# Patient Record
Sex: MALE | Race: WHITE | NOT HISPANIC OR LATINO | Employment: FULL TIME | URBAN - METROPOLITAN AREA
[De-identification: names, ages, dates, MRNs, and addresses within clinical notes are randomized per-mention and may not be internally consistent; named-entity substitution may affect disease eponyms.]

---

## 2017-02-06 ENCOUNTER — OFFICE VISIT - HEALTHEAST (OUTPATIENT)
Dept: INTERNAL MEDICINE | Facility: CLINIC | Age: 57
End: 2017-02-06

## 2017-02-06 DIAGNOSIS — R10.2 SUPRAPUBIC PAIN: ICD-10-CM

## 2017-02-06 DIAGNOSIS — Z82.49 FAMILY HISTORY OF HEART DISEASE: ICD-10-CM

## 2017-02-06 DIAGNOSIS — E78.5 HLD (HYPERLIPIDEMIA): ICD-10-CM

## 2017-02-06 DIAGNOSIS — Z72.0 TOBACCO USE: ICD-10-CM

## 2017-02-06 DIAGNOSIS — Z11.3 ROUTINE SCREENING FOR STI (SEXUALLY TRANSMITTED INFECTION): ICD-10-CM

## 2017-02-06 DIAGNOSIS — Z00.00 ANNUAL PHYSICAL EXAM: ICD-10-CM

## 2017-02-06 ASSESSMENT — MIFFLIN-ST. JEOR: SCORE: 1720.26

## 2017-02-07 ENCOUNTER — AMBULATORY - HEALTHEAST (OUTPATIENT)
Dept: LAB | Facility: CLINIC | Age: 57
End: 2017-02-07

## 2017-02-07 DIAGNOSIS — Z11.3 ROUTINE SCREENING FOR STI (SEXUALLY TRANSMITTED INFECTION): ICD-10-CM

## 2017-02-07 DIAGNOSIS — E78.5 HLD (HYPERLIPIDEMIA): ICD-10-CM

## 2017-02-07 DIAGNOSIS — R10.2 SUPRAPUBIC PAIN: ICD-10-CM

## 2017-02-07 LAB
CHOLEST SERPL-MCNC: 213 MG/DL
FASTING STATUS PATIENT QL REPORTED: YES
HDLC SERPL-MCNC: 34 MG/DL
HIV 1+2 AB+HIV1 P24 AG SERPL QL IA: NEGATIVE
LDLC SERPL CALC-MCNC: 135 MG/DL
TRIGL SERPL-MCNC: 220 MG/DL

## 2017-02-08 LAB
HBV CORE AB SERPL QL IA: NEGATIVE
HBV SURFACE AG SERPL QL IA: NEGATIVE
HCV AB SERPL QL IA: NEGATIVE
HEPATITIS B SURFACE ANTIBODY LHE- HISTORICAL: NEGATIVE

## 2017-02-09 ENCOUNTER — COMMUNICATION - HEALTHEAST (OUTPATIENT)
Dept: INTERNAL MEDICINE | Facility: CLINIC | Age: 57
End: 2017-02-09

## 2017-02-09 ENCOUNTER — COMMUNICATION - HEALTHEAST (OUTPATIENT)
Dept: LAB | Facility: CLINIC | Age: 57
End: 2017-02-09

## 2017-02-13 ENCOUNTER — AMBULATORY - HEALTHEAST (OUTPATIENT)
Dept: LAB | Facility: CLINIC | Age: 57
End: 2017-02-13

## 2017-02-13 DIAGNOSIS — Z11.3 ROUTINE SCREENING FOR STI (SEXUALLY TRANSMITTED INFECTION): ICD-10-CM

## 2017-02-14 ENCOUNTER — COMMUNICATION - HEALTHEAST (OUTPATIENT)
Dept: INTERNAL MEDICINE | Facility: CLINIC | Age: 57
End: 2017-02-14

## 2017-07-19 ENCOUNTER — RECORDS - HEALTHEAST (OUTPATIENT)
Dept: ADMINISTRATIVE | Facility: OTHER | Age: 57
End: 2017-07-19

## 2017-09-07 ENCOUNTER — OFFICE VISIT - HEALTHEAST (OUTPATIENT)
Dept: INTERNAL MEDICINE | Facility: CLINIC | Age: 57
End: 2017-09-07

## 2017-09-07 DIAGNOSIS — M54.50 LOW BACK PAIN: ICD-10-CM

## 2017-09-07 DIAGNOSIS — E78.5 HLD (HYPERLIPIDEMIA): ICD-10-CM

## 2017-09-14 ENCOUNTER — OFFICE VISIT - HEALTHEAST (OUTPATIENT)
Dept: PHYSICAL THERAPY | Facility: REHABILITATION | Age: 57
End: 2017-09-14

## 2017-09-14 DIAGNOSIS — M62.81 MUSCLE WEAKNESS (GENERALIZED): ICD-10-CM

## 2017-09-14 DIAGNOSIS — M53.3 SI (SACROILIAC) JOINT DYSFUNCTION: ICD-10-CM

## 2017-09-21 ENCOUNTER — OFFICE VISIT - HEALTHEAST (OUTPATIENT)
Dept: PHYSICAL THERAPY | Facility: REHABILITATION | Age: 57
End: 2017-09-21

## 2017-09-21 DIAGNOSIS — M62.81 MUSCLE WEAKNESS (GENERALIZED): ICD-10-CM

## 2017-09-21 DIAGNOSIS — M53.3 SI (SACROILIAC) JOINT DYSFUNCTION: ICD-10-CM

## 2017-09-27 ENCOUNTER — OFFICE VISIT - HEALTHEAST (OUTPATIENT)
Dept: PHYSICAL THERAPY | Facility: REHABILITATION | Age: 57
End: 2017-09-27

## 2017-09-27 DIAGNOSIS — M62.81 MUSCLE WEAKNESS (GENERALIZED): ICD-10-CM

## 2017-09-27 DIAGNOSIS — M53.3 SI (SACROILIAC) JOINT DYSFUNCTION: ICD-10-CM

## 2018-04-05 ENCOUNTER — AMBULATORY - HEALTHEAST (OUTPATIENT)
Dept: INTERNAL MEDICINE | Facility: CLINIC | Age: 58
End: 2018-04-05

## 2018-12-03 ENCOUNTER — RECORDS - HEALTHEAST (OUTPATIENT)
Dept: LAB | Facility: CLINIC | Age: 58
End: 2018-12-03

## 2018-12-04 LAB
ALBUMIN SERPL-MCNC: 4 G/DL (ref 3.5–5)
ALP SERPL-CCNC: 120 U/L (ref 45–120)
ALT SERPL W P-5'-P-CCNC: 20 U/L (ref 0–45)
ANION GAP SERPL CALCULATED.3IONS-SCNC: 8 MMOL/L (ref 5–18)
AST SERPL W P-5'-P-CCNC: 16 U/L (ref 0–40)
BILIRUB SERPL-MCNC: 0.5 MG/DL (ref 0–1)
BUN SERPL-MCNC: 12 MG/DL (ref 8–22)
CALCIUM SERPL-MCNC: 8.8 MG/DL (ref 8.5–10.5)
CHLORIDE BLD-SCNC: 108 MMOL/L (ref 98–107)
CHOLEST SERPL-MCNC: 208 MG/DL
CO2 SERPL-SCNC: 25 MMOL/L (ref 22–31)
CREAT SERPL-MCNC: 0.75 MG/DL (ref 0.7–1.3)
FASTING STATUS PATIENT QL REPORTED: NO
GFR SERPL CREATININE-BSD FRML MDRD: >60 ML/MIN/1.73M2
GLUCOSE BLD-MCNC: 75 MG/DL (ref 70–125)
HDLC SERPL-MCNC: 38 MG/DL
LDLC SERPL CALC-MCNC: 142 MG/DL
POTASSIUM BLD-SCNC: 3.9 MMOL/L (ref 3.5–5)
PROT SERPL-MCNC: 6.9 G/DL (ref 6–8)
SODIUM SERPL-SCNC: 141 MMOL/L (ref 136–145)
TRIGL SERPL-MCNC: 138 MG/DL

## 2020-06-24 ENCOUNTER — RECORDS - HEALTHEAST (OUTPATIENT)
Dept: ADMINISTRATIVE | Facility: OTHER | Age: 60
End: 2020-06-24

## 2020-07-10 ENCOUNTER — HOSPITAL ENCOUNTER (OUTPATIENT)
Dept: ULTRASOUND IMAGING | Facility: HOSPITAL | Age: 60
Discharge: HOME | End: 2020-07-10
Attending: FAMILY MEDICINE

## 2020-07-10 DIAGNOSIS — I70.219 ATHEROSCLEROSIS OF NATIVE ARTERIES OF EXTREMITIES WITH INTERMITTENT CLAUDICATION, UNSPECIFIED EXTREMITY (H): ICD-10-CM

## 2020-07-15 ENCOUNTER — RECORDS - HEALTHEAST (OUTPATIENT)
Dept: ADMINISTRATIVE | Facility: OTHER | Age: 60
End: 2020-07-15

## 2020-07-23 ENCOUNTER — AMBULATORY - HEALTHEAST (OUTPATIENT)
Dept: INTERVENTIONAL RADIOLOGY/VASCULAR | Facility: HOSPITAL | Age: 60
End: 2020-07-23

## 2020-07-23 ENCOUNTER — RECORDS - HEALTHEAST (OUTPATIENT)
Dept: ADMINISTRATIVE | Facility: OTHER | Age: 60
End: 2020-07-23

## 2020-07-23 DIAGNOSIS — I70.213 ATHEROSCLEROSIS OF NATIVE ARTERY OF BOTH LOWER EXTREMITIES WITH INTERMITTENT CLAUDICATION (H): ICD-10-CM

## 2020-07-23 RX ORDER — CLOPIDOGREL BISULFATE 75 MG/1
75 TABLET ORAL DAILY
Qty: 90 TABLET | Refills: 3 | Status: SHIPPED | OUTPATIENT
Start: 2020-07-23

## 2020-07-23 RX ORDER — ATORVASTATIN CALCIUM 40 MG/1
40 TABLET, FILM COATED ORAL DAILY
Qty: 90 TABLET | Refills: 3 | Status: SHIPPED | OUTPATIENT
Start: 2020-07-23 | End: 2021-07-23

## 2020-08-19 ENCOUNTER — AMBULATORY - HEALTHEAST (OUTPATIENT)
Dept: INTERVENTIONAL RADIOLOGY/VASCULAR | Facility: HOSPITAL | Age: 60
End: 2020-08-19

## 2020-08-19 DIAGNOSIS — Z11.59 ENCOUNTER FOR SCREENING FOR OTHER VIRAL DISEASES: ICD-10-CM

## 2020-08-20 ENCOUNTER — RECORDS - HEALTHEAST (OUTPATIENT)
Dept: LAB | Facility: CLINIC | Age: 60
End: 2020-08-20

## 2020-08-20 LAB
ALBUMIN SERPL-MCNC: 4 G/DL (ref 3.5–5)
ALP SERPL-CCNC: 143 U/L (ref 45–120)
ALT SERPL W P-5'-P-CCNC: 31 U/L (ref 0–45)
ANION GAP SERPL CALCULATED.3IONS-SCNC: 9 MMOL/L (ref 5–18)
AST SERPL W P-5'-P-CCNC: 17 U/L (ref 0–40)
BILIRUB SERPL-MCNC: 0.4 MG/DL (ref 0–1)
BUN SERPL-MCNC: 11 MG/DL (ref 8–22)
CALCIUM SERPL-MCNC: 8.7 MG/DL (ref 8.5–10.5)
CHLORIDE BLD-SCNC: 105 MMOL/L (ref 98–107)
CHOLEST SERPL-MCNC: 109 MG/DL
CO2 SERPL-SCNC: 25 MMOL/L (ref 22–31)
CREAT SERPL-MCNC: 0.94 MG/DL (ref 0.7–1.3)
FASTING STATUS PATIENT QL REPORTED: ABNORMAL
GFR SERPL CREATININE-BSD FRML MDRD: >60 ML/MIN/1.73M2
GLUCOSE BLD-MCNC: 111 MG/DL (ref 70–125)
HDLC SERPL-MCNC: 30 MG/DL
LDLC SERPL CALC-MCNC: 57 MG/DL
POTASSIUM BLD-SCNC: 4.8 MMOL/L (ref 3.5–5)
PROT SERPL-MCNC: 6.7 G/DL (ref 6–8)
SODIUM SERPL-SCNC: 139 MMOL/L (ref 136–145)
TRIGL SERPL-MCNC: 108 MG/DL

## 2020-09-02 ENCOUNTER — RECORDS - HEALTHEAST (OUTPATIENT)
Dept: ADMINISTRATIVE | Facility: OTHER | Age: 60
End: 2020-09-02

## 2020-09-03 ENCOUNTER — RECORDS - HEALTHEAST (OUTPATIENT)
Dept: ADMINISTRATIVE | Facility: OTHER | Age: 60
End: 2020-09-03

## 2020-09-08 ENCOUNTER — RECORDS - HEALTHEAST (OUTPATIENT)
Dept: ADMINISTRATIVE | Facility: OTHER | Age: 60
End: 2020-09-08

## 2020-09-08 ENCOUNTER — AMBULATORY - HEALTHEAST (OUTPATIENT)
Dept: FAMILY MEDICINE | Facility: CLINIC | Age: 60
End: 2020-09-08

## 2020-09-08 DIAGNOSIS — Z11.59 ENCOUNTER FOR SCREENING FOR OTHER VIRAL DISEASES: ICD-10-CM

## 2020-09-09 ENCOUNTER — RECORDS - HEALTHEAST (OUTPATIENT)
Dept: ADMINISTRATIVE | Facility: OTHER | Age: 60
End: 2020-09-09

## 2020-09-09 LAB
SARS-COV-2 PCR COMMENT: NORMAL
SARS-COV-2 RNA SPEC QL NAA+PROBE: NEGATIVE
SARS-COV-2 VIRUS SPECIMEN SOURCE: NORMAL

## 2020-09-10 ENCOUNTER — COMMUNICATION - HEALTHEAST (OUTPATIENT)
Dept: INTERVENTIONAL RADIOLOGY/VASCULAR | Facility: HOSPITAL | Age: 60
End: 2020-09-10

## 2020-09-10 ENCOUNTER — RECORDS - HEALTHEAST (OUTPATIENT)
Dept: ADMINISTRATIVE | Facility: OTHER | Age: 60
End: 2020-09-10

## 2020-09-10 ENCOUNTER — HOSPITAL ENCOUNTER (OUTPATIENT)
Dept: INTERVENTIONAL RADIOLOGY/VASCULAR | Facility: HOSPITAL | Age: 60
Discharge: HOME | End: 2020-09-10
Attending: RADIOLOGY | Admitting: RADIOLOGY

## 2020-09-10 ENCOUNTER — COMMUNICATION - HEALTHEAST (OUTPATIENT)
Dept: SCHEDULING | Facility: CLINIC | Age: 60
End: 2020-09-10

## 2020-09-10 DIAGNOSIS — I73.9 PVD (PERIPHERAL VASCULAR DISEASE) (H): ICD-10-CM

## 2020-09-10 DIAGNOSIS — I73.9 CLAUDICATION (H): ICD-10-CM

## 2020-09-10 LAB
ACT BLD: 133 SECONDS (ref 105–167)
ACT BLD: 331 SECONDS (ref 105–167)
ACT BLD: 352 SECONDS (ref 105–167)
CREAT SERPL-MCNC: 0.79 MG/DL (ref 0.7–1.3)
GFR SERPL CREATININE-BSD FRML MDRD: >60 ML/MIN/1.73M2
HGB BLD-MCNC: 17.4 G/DL (ref 14–18)
INR PPP: 0.93 (ref 0.9–1.1)
PLATELET # BLD AUTO: 160 THOU/UL (ref 140–440)

## 2020-09-10 ASSESSMENT — MIFFLIN-ST. JEOR: SCORE: 1704.83

## 2021-02-09 ENCOUNTER — AMBULATORY - HEALTHEAST (OUTPATIENT)
Dept: INTERVENTIONAL RADIOLOGY/VASCULAR | Facility: HOSPITAL | Age: 61
End: 2021-02-09

## 2021-05-05 ENCOUNTER — RECORDS - HEALTHEAST (OUTPATIENT)
Dept: LAB | Facility: CLINIC | Age: 61
End: 2021-05-05

## 2021-05-05 LAB — PSA SERPL-MCNC: 3.1 NG/ML (ref 0–4.5)

## 2021-05-18 ENCOUNTER — AMBULATORY - HEALTHEAST (OUTPATIENT)
Dept: INTERVENTIONAL RADIOLOGY/VASCULAR | Facility: HOSPITAL | Age: 61
End: 2021-05-18

## 2021-05-18 DIAGNOSIS — I73.9 PVD (PERIPHERAL VASCULAR DISEASE) (H): ICD-10-CM

## 2021-05-30 ENCOUNTER — RECORDS - HEALTHEAST (OUTPATIENT)
Dept: ADMINISTRATIVE | Facility: CLINIC | Age: 61
End: 2021-05-30

## 2021-05-30 VITALS — BODY MASS INDEX: 26.19 KG/M2 | WEIGHT: 193.4 LBS | HEIGHT: 72 IN

## 2021-05-31 VITALS — WEIGHT: 194 LBS | BODY MASS INDEX: 26.31 KG/M2

## 2021-06-01 ENCOUNTER — RECORDS - HEALTHEAST (OUTPATIENT)
Dept: LAB | Facility: CLINIC | Age: 61
End: 2021-06-01

## 2021-06-01 LAB
ANION GAP SERPL CALCULATED.3IONS-SCNC: 9 MMOL/L (ref 5–18)
BUN SERPL-MCNC: 14 MG/DL (ref 8–22)
CALCIUM SERPL-MCNC: 8.4 MG/DL (ref 8.5–10.5)
CHLORIDE BLD-SCNC: 105 MMOL/L (ref 98–107)
CO2 SERPL-SCNC: 25 MMOL/L (ref 22–31)
CREAT SERPL-MCNC: 0.79 MG/DL (ref 0.7–1.3)
GFR SERPL CREATININE-BSD FRML MDRD: >60 ML/MIN/1.73M2
GLUCOSE BLD-MCNC: 133 MG/DL (ref 70–125)
POTASSIUM BLD-SCNC: 4.5 MMOL/L (ref 3.5–5)
SODIUM SERPL-SCNC: 139 MMOL/L (ref 136–145)

## 2021-06-05 VITALS — WEIGHT: 190 LBS | HEIGHT: 72 IN | BODY MASS INDEX: 25.73 KG/M2

## 2021-06-08 NOTE — PROGRESS NOTES
"Assessment/Plan:     1. Annual physical exam  - I recommended he eat a healthy diet and exercise on a regular basis.  - The following high BMI interventions were performed this visit: encouragement to exercise    2. Routine screening for STI (sexually transmitted infection)  - HIV Antigen/Antibody Screening Cascade; Future  - Chlamydia trachomatis & Neisseria gonorrhoeae, Amplified Detection; Future  - Hepatitis B Core Antibody (Anti-HBc); Future  - Hepatitis B Surface Antibody (Anti-HBs); Future  - Hepatitis B Surface Antigen (HBsAG); Future  - Hepatitis C Antibody (Anti-HCV); Future    3. Suprapubic pain  - Urinalysis-UC if Indicated; Future  - Records requested from previous provider, reviewed available Care Everywhere records but unable to pull forward recent lab test  - He is advised to follow up sooner if symptoms are rapidly worsening/changing but given the length of time he has had symptoms will request records, await lab results and see him back in about 3-4 weeks     4. HLD (hyperlipidemia)  - Lipid Profile; Future  - Basic Metabolic Panel; Future  - [facility] LAV; Future    5. Tobacco use  - Smoking cessation discussion today. He is interested in smoking cessation and plans to stop smoking on 2/15/17. He is prescribed Chantix.       Subjective:     Blayne Jones is a 56 y.o. male who presents for an annual exam. He has several concerns today that he would like to have addressed.     He has a 2.5 year history of intermittent suprapubic pain. He has undergone a \"partial colonoscopy\" in the last 2.5 years and a colonoscopy for screening in the past 1 year. The pain worsens when he is having a bowel movement but does not resolve once he is done having a BM. He has a lot of gas. He denies blood in the stool or tarry stools. No blood in the urine. He denies having to push or strain to have a bowel movement. The pain has been more bothersome in the past 2.5 weeks and prior to this he had had a period of relatively " "mild or no pain. Typically the pain does not disrupt his usual activities.     LBP- chronic, does not do any particular treatments for this and no current concerns.     HLD- he was taking atorvastatin in the past but stopped this on his own after his work lipid panel was very low and didn't feel he needed to take the medication any longer. He has a family history of heart disease in his father who  at age 51 after a heart attack and his brother had a heart attack at age 55.     Tobacco use- interested in smoking cessation, he has tried Chantix in the past but \"didn't give it a fair try\". We reviewed lung cancer screening, he declines today but will consider this in the future.     Requests STI screening. He is in a new relationship and his partner requested that he have this done. He has a history of multiple partners in the past. No history of IV drug use. Only female partners.     The patient reports that there is not domestic violence in his life.     Healthy Habits:   Regular Exercise: No, walks in the summer.   Healthy Diet: No, recommended cutting back on soda and junk foods  Dental Visits Regularly: Yes  Colonoscopy: Yes, records requested         There is no immunization history on file for this patient.  Immunization status: UTD and documented         Current Outpatient Prescriptions   Medication Sig Dispense Refill     [START ON 3/6/2017] varenicline (CHANTIX CONTINUING MONTH BOX) 1 mg tablet Take 1 tablet (1 mg total) by mouth 2 (two) times a day. Take with full glass of water. 60 tablet 1     varenicline (CHANTIX STARTING MONTH NELSON) 0.5 mg (11)- 1 mg (42) tablet Give with meals and with a full glass of water. 53 tablet 0     No current facility-administered medications for this visit.      History reviewed. No pertinent past medical history.  Past Surgical History:   Procedure Laterality Date     CERVICAL SPINE SURGERY       Review of patient's allergies indicates no known allergies.  Family " History   Problem Relation Age of Onset     Diabetes Mother      Heart disease Father      Heart disease Brother      Social History     Social History     Marital status:      Spouse name: N/A     Number of children: N/A     Years of education: N/A     Occupational History     Not on file.     Social History Main Topics     Smoking status: Current Every Day Smoker     Packs/day: 1.00     Types: Cigarettes     Smokeless tobacco: Not on file     Alcohol use No      Comment: quit 4/7/1986     Drug use: Yes     Special: Heroin     Sexual activity: Not on file     Other Topics Concern     Not on file     Social History Narrative     No narrative on file       Review of Systems  General:  Negative except as noted above  Eyes: Negative except as noted above  Ears/Nose/Throat: Negative except as noted above  Cardiovascular: Negative except as noted above  Respiratory:  Negative except as noted above  Gastrointestinal:  Negative except as noted above  Musculoskeletal:  Negative except as noted above  Skin: Negative except as noted above  Neurologic: Negative except as noted above  Psychiatric: Negative except as noted above  Endocrine: Negative except as noted above  Heme/Lymphatic: Negative except as noted above   Allergic/Immunologic: Negative except as noted above      Objective:      Vitals:    02/06/17 1536   BP: 126/84   Patient Site: Left Arm   Patient Position: Sitting   Cuff Size: Adult Regular   Pulse: 64   Weight: 193 lb 6.4 oz (87.7 kg)   Height: 6' (1.829 m)     Wt Readings from Last 3 Encounters:   02/06/17 193 lb 6.4 oz (87.7 kg)     Body mass index is 26.23 kg/(m^2).     Physical Exam:  Constitutional: Well developed, well nourished, no acute distress.  HEENT: normocephalic/atraumatic, PERRLA/EOMI, TMs: Gray, normal light reflex, no nasal discharge.  Oral mucosa: moist; no erythema/exudate  Neck: No LAD/masses/thyromegaly/bruits  Lungs: clear bilaterally  Heart: regular rate and rhythm, no  murmurs/gallops/rubs  Abdomen: Normal bowel sounds, soft, non-tender, non-distended, no masses, neg Berger's/McBurney's, no rebound/guarding  Rectal: normal tone, no masses, brown stool. Prostate smooth, sulcus midline. No masses or tenderness  Lymphatics: no supraclavicular. No edema.  Neuro: A&O x 3, sensory intact to light touch.  Musculoskeletal: no gross deformities.  Skin: no rashes or lesions.  Psych: Behavior appropriate, engaging.  Thought processes congruent, non-tangential

## 2021-06-09 NOTE — CONSULTS
[facility]  VASCULAR AND INTERVENTIONAL OUTPATIENT [facility]INIC           NEW PATIENT - TELEPHONE VISIT  7/23/2020 8:00 AM    REASON FOR CONSULT: Bilateral lower extremity claudication, peripheral arterial disease  REQUESTING PROVIDER: Dr. Barry Whitney    HPI: Mr. Jones is a 59-year-old gentleman seen in consultation regarding bilateral lower extremity claudication. The patient reports his symptoms have been developing over the past 6 months. He reports a tightness in his thighs and calfs after walking approximately 1 block. The patient reports his symptoms are relieved with rest. Overall, the patient notes significant limitation in his daily lifestyle but denies rest pain or lower extremity wounds/ulceration. His past history is significant for current smoking for which he has been for the past 40 years. The patient reports smoking 1/2-1 pack daily.    At this time he denies any chest pain, shortness of breath, dyspnea or neurologic defects. He denies any postprandial abdominal pain or dizziness with upper extremity exertion.    PAST MEDICAL AND SURGICAL HISTORY: Hyperlipidemia    MEDICATIONS: No current medications.    IMAGING FINDINGS: All imaging was personally reviewed by myself and with the patient.  Ankle-brachial index dated 7/10/2020 demonstrates severe bilateral exercise induced ischemia.    CT angiogram of the abdomen and pelvis with lower extremity runoff dated 7/15/2020    Proximal common iliac artery disease with occlusion of the right ERNESTO and focal near occlusive stenosis on the left ERNESTO. There is superimposed diffuse moderate plaque of the right ERNESTO to the iliac bifurcation.  Infrarenal aorta has soft atheromatous plaque/mural thrombus which causes approximately 30% luminal narrowing. There is moderate irregularity of the plaque at the anterior aortic wall just inferior to the KANDI takeoff. No evidence of penetrating ulcer.  The bilateral femoral, popliteal, and infrapopliteal segments are widely  patent. Reconstituted flow to the right lower extremity is predominantly through the inferior epigastric and deep circumflex iliac artery to common femoral artery collateral pathways.    REVIEW OF SYMPTOMS: A comprehensive 10 point of symptoms was performed. All are negative except those noted in the history of present illness.    ASSESSMENT:     59-year-old male with newly diagnosed peripheral arterial disease and bilateral lower extremity claudication presents for initial evaluation. I had an extensive discussion with the patient regarding the pathogenesis, diagnosis and treatment options of peripheral arterial disease. At this time, he reports severe claudication symptoms and denies rest pain or tissue loss. We reviewed his noninvasive imaging in detail.    PLAN:    1. GUIDELINE BASED MEDICAL THERAPY FOR PERIPHERAL ARTERIAL DISEASE (CLARE GRADE 1, CATEGORY 3).    -I've encouraged the patient to begin regular exercise therapy. He does not wish to pursue a supervised exercise therapy program. As such, I have recommended walking at least 3 times per week with sessions lasting 30 to 60 minutes. He is to walk to the point of claudication and then rest. It is hopeful this will increase maximum walking distance and maximum pain free walking distance.  -Start high intensity statin therapy with Lipitor 40 mg once daily for secondary prophylaxis of major adverse cardiac events (Heart Protection Study and REACH registry studies). I would be in favor of prescribing the maximally tolerated statin dose. This prescription has been prescribed electronically via Epic.   -Start antiplatelet therapy. I would prefer to begin Plavix 75 mg once daily given superiority in decreasing composite cardiovascular mortality, myocardial infarction and stroke over aspirin (CAPrie trial). This prescription has been prescribed electronically via Epic.  -Encourage smoking cessation. I have instructed him to continue CT lung cancer screening  with Dr. Whitney based on his long-standing history of smoking.  -We will also perform a carotid ultrasound to screen for disease in this vascular bed.    2. ENDOVASCULAR MANAGEMENT OF BILATERAL COMMON ILIAC DISEASE (TASC B)  -Schedule pelvic angiography and likely covered stent placement in both common iliac arteries. This will be done on an outpatient basis with discharge the same day. Smoking cessation was again discussed with the patient as this will significantly limit the durability of any endovascular or surgical intervention.      Andres Frey MD, [facility]  Vascular and Interventional Physician  Vascular Medicine  Internal Medicine  [facility]  Clinic: [phone]

## 2021-06-11 NOTE — PROGRESS NOTES
Patient up to ambulate in preparation for discharge, tolerated well. Both right and left groin puncture sites remained soft and flat. No bleeding noted. Patient discharged home with sister. Verbalizes understanding of discharge instructions.

## 2021-06-11 NOTE — PROCEDURES
[facility]    Procedure: IR Procedure Note    Date/Time: 9/10/2020 9:31 AM  Performed by: Andres Frey MD  Authorized by: Andres Frey MD       Universal Protocol    Site marked: Yes    Prior images obtained and reviewed: Yes    Required items: required blood products, implants, devices, and special equipment available    Patient identity confirmed: verbally with patient    Reevaluation: Patient was reevaluated immediately before administering moderate or deep sedation or anesthesia    Confirmation checklist: patient's identity using two indicators, relevant allergies, procedure was appropriate and matched the consent or emergent situation and correct equipment/implants were available    Time out: Immediately prior to procedure a time out was called to verify the correct patient, procedure, equipment, support staff and site/side marked as required    Universal Protocol: Joint Commission Universal Protocol was followed    Preparation: Patient was prepped and draped in the usual sterile fashion    Anesthesia    Local anesthesia used?: Yes    Sedation    Patient sedation: Yes    Vital signs: Vital signs monitored during sedation  Specimens: none  Complications: None  Condition: Stable  Plan: Continue Plavix/Lipitor, will add aspirin for 3 months then D/C. I will follow up with Blayne in 6 weeks    Post-procedure    Patient tolerance: Patient tolerated the procedure well with no immediate complications   Length of time physician present for 1:1 monitoring during sedation: 60

## 2021-06-11 NOTE — PRE-PROCEDURE
Procedure Name: BLE angiogram  Date/Time: 9/10/2020 7:58 AM  Written consent obtained?: Yes  Risks and benefits: Risks, benefits and alternatives were discussed  Consent given by: patient  Expected level of sedation: moderate  ASA Class: Class 3- Severe systemic disease, definite functional limitations  Mallampati: Grade 3- soft palate visible, posterior pharyngeal wall not visible  Patient states understanding of procedure being performed: Yes  Patient's understanding of procedure matches consent: Yes  Procedure consent matches procedure scheduled: Yes  Appropriately NPO: yes  Lungs: lungs clear with good breath sounds bilaterally  Heart: normal heart sounds and rate  History & Physical reviewed: History and physical reviewed and no updates needed  Statement of review: I have reviewed the lab findings, diagnostic data, medications, and the plan for sedation

## 2021-06-11 NOTE — PLAN OF CARE
"Pt preparing for discharge got up to ambulate and use bathroom.  Upon return to bed 5 minutes later, bilateral groins inspected and right groin puncture site oozing bright red blood.  Immediate pressure applied with Pt flat on cart and another RN notified Dr. Frey.  Pressure held for 10 minutes as instructed and will hold Pt another 2 hours for monitoring.  Right groin currently soft with no evidence of further bleeding.  Pt is expressing anger and frustration with additional monitoring and threatening to \"walk home later if I have to\".  Agreeable to stay after explanation of risks.  Bilateral pedal pulses remain strongly palpable.  BP elevated 20 points after incident.  Will resume 15 minute monitoring.    "

## 2021-06-12 NOTE — PROGRESS NOTES
Internal Medicine Office Visit  Patient Name: Blayne Jones  Patient Age: 56 y.o.  YOB: 1960  MRN: [id number]  ?  Date of Visit: 2017  Reason for Office Visit:   Chief Complaint   Patient presents with     Hip Pain     Rt hip, no known injury, usually happens when he walks a lot       Assessment / Plan / Medical Decision Makin. Low back pain  2. HLD (hyperlipidemia)  - Recommended melatonin for sleep   - Atorvastatin 40 mg daily sent to pharmacy based on his last lipid panel  - Referral to PT/OT for evaluation and treatment of low back pain. Could consider MRI of the low back if worsening or failure to improve with PT/OT for possible TAMMI treatment    Health Maintenance Review  Health Maintenance   Topic Date Due     TDAP ADULT ONE TIME DOSE  1977     ADVANCE DIRECTIVES DISCUSSED WITH PATIENT  1977     COLONOSCOPY  2009     INFLUENZA VACCINE RULE BASED (1) 2017     TD 18+ HE  2020           I am having Mr. Jones start on atorvastatin.     HPI:   Encounter Diagnoses   Name Primary?     Low back pain Yes     HLD (hyperlipidemia)       Blayne Jones is a 57 y/o male who presents to the office today for back pain.     Right low back pain - onset about 3-4 months ago, no known injury. Seems to be getting worse. The pain starts in the right low back and radiates into the right lateral leg to the knee. He saw a chiropractor and didn't have any relief. He does have a history of working as a . 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 Walking really seems to flair the pain, if he sits or lays flat the pain is relieved. He takes ibuprofen 800 mg but he only takes this at night and he falls asleep so he doesn't notice if this does much for him.     He has not been sleeping well. He wakes  up multiple times during the night.       Review of Systems: No loss of bowel or bladder control       Current Scheduled Meds:  Outpatient Encounter Prescriptions as of 9/7/2017   Medication Sig Dispense Refill     atorvastatin (LIPITOR) 40 MG tablet Take 1 tablet (40 mg total) by mouth daily. 90 tablet 3     No facility-administered encounter medications on file as of 9/7/2017.      History reviewed. No pertinent past medical history.  Past Surgical History:   Procedure Laterality Date     CERVICAL SPINE SURGERY  2012     Social History   Substance Use Topics     Smoking status: Current Every Day Smoker     Packs/day: 1.00     Types: Cigarettes     Smokeless tobacco: None     Alcohol use No      Comment: quit 4/7/1986       Objective / Physical Examination:  Vitals:    09/07/17 1445   BP: 132/72   Pulse: 75   Weight: 194 lb (88 kg)     Wt Readings from Last 3 Encounters:   09/07/17 194 lb (88 kg)   02/06/17 193 lb 6.4 oz (87.7 kg)     Body mass index is 26.31 kg/(m^2).     General Appearance: Alert and oriented, cooperative, affect appropriate, speech clear, in no apparent distress  Back: Symmetrical and non-tender. No spinous process tenderness. He has mildly positive right straight leg raise.   Lungs: Clear to auscultation bilaterally. Normal inspiratory and expiratory effort  Cardiovascular: Regular rate, normal S1, S2  Extremities: no LE edema. DP pulses 2+       Orders Placed This Encounter   Procedures     Ambulatory referral to PT/OT   Followup: Return if symptoms worsen or fail to improve. earlier if needed.      Hansa Lyles, CNP  [facility]

## 2021-06-13 NOTE — PROGRESS NOTES
[facility] Daily Progress     Patient Name: Blayne Jones  Date: 9/21/2017  Visit #: 2  PTA visit #:    Referral Diagnosis: Low Back Pain  Referring provider: Hansa Lyles FNP  Visit Diagnosis:     ICD-10-CM    1. SI (sacroiliac) joint dysfunction M53.3    2. Muscle weakness (generalized) M62.81      Initial Assessment:    Blayne Jones is a 56 y.o. male who presents to therapy today with chief complaints of right sided low back pain that started about 4-5 months ago and has not change since start. Pain increases with walking and weight bearing on the right side, if he rests or shifts weight off the pain will get better. Patient present with decrease lumbar ROM in flexion with some tightness in back and legs, hypomobile sacral base and SIJ to PA mobs and positive SIJ findings such as FADIR, JEREMY and resisted abduction tests. Patient will benefit from skilled PT intervention to increase joint mobility and strength to decrease pain and overall functional mobility.     Assessment:     HEP/POC compliance is  good .  Patient demonstrates understanding/independence with home program.  Patient is benefitting from skilled physical therapy and is making steady progress toward functional goals.  Patient is appropriate to continue with skilled physical therapy intervention, as indicated by initial plan of care.    Goal Status:  Pt. will be independent with home exercise program in : 6 weeks  Pt. will be able to walk : 30 minutes;60 minutes;with no pain;with less difficulty;for household mobility;for community mobility;for work;in 6 weeks  Pt. will bend: to dress;to clean;with less pain;with less difficulty;for self care;for work;in 6 weeks  Patient will ascend / descend: stairs;step;with less pain;with less difficulty;in 6 weeks  No Data Recorded    Plan / Patient Education:     Continue with initial plan of care.  Progress with home program as tolerated.   Plan for next visit: continue with lumbar and  SIJ rotational mobs, MET for pelvic alignment and sacral torsion if noted, progress strengthening and ROM exercises as able.     Subjective:     Pain Ratin  Pain is about the same overall, he states no pain when sitting even while pushing on the NuStep. Pain at work was off and on and would sit and rest and then it goes away and he cane get back to work. Pain gets up to 8/10 when at work after walking for longer periods of time but pushing a broom and wiping table, taking out trash all seem to be ok but walking a distance from here to beam would increase pain.   Felt ok after last session but roughly the same.       Objective:         Treatment Today   2017  TREATMENT MINUTES COMMENTS   Evaluation     Self-care/ Home management     Manual therapy 15  In Side Lying:   - Lumbar rotational mobs grade III,+ to IV   - SIJ rotational mobs grade III+  In Supine:  - MET shotgun and scissor correction for right anterior/left posterior innominate  - Long leg distraction   Neuromuscular Re-education     Therapeutic Activity     Therapeutic Exercises 15  NuStep WL 5 B LEs only x 5 min   Clam shells x 15 reps B  Prone hip extension x 10 reps B alternating sides  Reviewed fig 4 stretching and hook-lying hip abduction with band from HEP    Gait training     Modality__________________                Total 30     Blank areas are intentional and mean the treatment did not include these items.     Laney Melton, PT, DPT   2017

## 2021-06-13 NOTE — PROGRESS NOTES
[facility]   [place]-Pelvic Initial Evaluation    Patient Name: Blayne Jones  Date of evaluation: 9/15/2017  Referral Diagnosis: Low back pain  Referring provider: Hansa Lyles FNP  Visit Diagnosis:     ICD-10-CM    1. SI (sacroiliac) joint dysfunction M53.3    2. Muscle weakness (generalized) M62.81        Assessment:     Blayne Jones is a 56 y.o. male who presents to therapy today with chief complaints of right sided low back pain that started about 4-5 months ago and has not change since start. Pain increases with walking and weight bearing on the right side, if he rests or shifts weight off the pain will get better. Patient present with decrease lumbar ROM in flexion with some tightness in back and legs, hypomobile sacral base and SIJ to PA mobs and positive SIJ findings such as FADIR, JEREMY and resisted abduction tests. Patient will benefit from skilled PT intervention to increase joint mobility and strength to decrease pain and overall functional mobility.     Impairments in  pain, posture, ROM, joint mobility, strength  The POC is dynamic and will be modified on an ongoing basis.  Barriers to achieving goals as noted in the assessment section may affect outcome.  Prognosis to achieve goals is  good   Pt. is appropriate for skilled PT intervention as outlined in the Plan of Care (POC).  Pt. is a good candidate for skilled PT services to improve pain levels and function.    Goals:  Pt. will be independent with home exercise program in : 6 weeks  Pt. will be able to walk : 30 minutes;60 minutes;with no pain;with less difficulty;for household mobility;for community mobility;for work;in 6 weeks  Pt. will bend: to dress;to clean;with less pain;with less difficulty;for self care;for work;in 6 weeks  Patient will ascend / descend: stairs;step;with less pain;with less difficulty;in 6 weeks  No Data Recorded    Patient's expectations/goals are realistic.    Barriers to Learning or Achieving  Goals:  No Barriers.       Plan / Patient Instructions:        Plan of Care:   Authorization / Certification Start Date: 17  Communication with: Referral Source  Patient Related Instruction: Nature of Condition;Treatment plan and rationale;Self Care instruction;Body mechanics;Posture;Precautions;Next steps;Expected outcome;Basis of treatment  Times per Week: 1-2  Number of Weeks: 6-8 weeks  Number of Visits: up tp 10 sessions  Therapeutic Exercise: ROM;Stretching;Strengthening  Neuromuscular Reeducation: kinesio tape;posture;core  Manual Therapy: soft tissue mobilization;myofascial release;joint mobilization;muscle energy;strain counterstrain    Plan for next visit: lumbar and SIJ rotational mobs, MET for pelvic alignment and sacral torsion if noted, progress strengthening and ROM exercises as able.      Subjective:         Social information:   Living Situation:single family home   Occupation:[employer]   Work Status:Working full time   Equipment Available: None    History of Present Illness:    Blayne is a 56 y.o. male who presents to therapy today with complaints of right hip and low back pain that started about 4-5 months ago without a known injury. The pain is about the same as when it started but it does not change too much. Pain is posterior hip and travels down to the knee of walking for more than a few minutes. If he walks for too long the pain increases and he will limp, if he takes pressure off of the leg for 2-5 minutes then the pain is better. No hx this pain before, he did have an injury to his back where he pinned inbetween a truck and building but was not injured with lasting pain that day. Otherwise just wear an tear from physical jobs in the past.     Pain Ratin  Pain rating at best: 6  Pain rating at worst: 8  Pain description: aching and weakness    Functional limitations are described as occurring with:   ascending and descending stairs or curbs    Patient reports benefit from:   rest           Objective:      Note: Items left blank indicates the item was not performed or not indicated at the time of the evaluation.    Patient Outcome Measures :    Modified Oswestry Low Back Pain Disablity Questionnaire  in %: 16   Scores range from 0-100%, where a score of 0% represents minimal pain and maximal function. The minimal clinically important difference is a score reduction of 12%.    Examination  1. SI (sacroiliac) joint dysfunction     2. Muscle weakness (generalized)       Involved side: Right  Posture Observation:      General sitting posture is  normal.  General standing posture is fair.  Lumbopelvic complex: Moderately decreased lumbar lordosis    Lumbar ROM:  9/15/2017  Date: 9/15/2017     *Indicate scale AROM AROM AROM   Lumbar Flexion To mid tibia, tight in legs, no pain      Lumbar Extension Min dec, no pain      Right Left Right Left Right Left   Lumbar Sidebending Min dec, no pain Min dec, no pain       Lumbar Rotation WNL no pain WNL, No pain       Thoracic Flexion      Thoracic Extension      Thoracic Sidebending         Thoracic Rotation           Lower Extremity Strength:   9/15/2017  Date: 9/15/2017     LE strength/5 Right Left Right Left Right Left   Hip Flexion (L1-3) 5 5       Hip Extension (L5-S1) 4+ 4+       Hip Abduction (L4-5) 4 twinge of pain 4+       Hip Adduction (L2-3) 5 5       Hip External Rotation 5 5       Hip Internal Rotation 5 twinge of pain 5       Knee Extension (L3-4) 5 5       Knee Flexion 5 5       Ankle Dorsiflexion (L4-5) 5 5       Great Toe Extension (L5)         Ankle Plantar flexion (S1)         Abdominals        Sensation    WNL to light touch in B LEs  Reflex Testing: not tested     Palpation: mild tenderness to sacral base and greater trochanter on the right side     Lumbar Special Tests:   9/15/2017  Lumbar Special Tests Right Left SI Tests Right  Left   Quadrant test   SI Compression     Straight leg raise - - SI Distraction     Crossover response  - - POSH Test     Slump - - Sacral Thrust + -   Sit-up test  FADIR + -   Trunk extensor endurance test  Resisted Abduction Mild + -   Prone instability test  Other: JEREMY + -   Pubic shotgun  Other:       Repeated Motion Testing:  Does not centralize  Does not peripheralize    Passive Mobility - Joint Integrity:  Hypomobile  right sacral base/SIJ    Treatment Today   9/15/2017  TREATMENT MINUTES COMMENTS   Evaluation 25     Self-care/ Home management     Manual therapy 20 In Side Lying:   - Lumbar rotational mobs grade II-III, - SIJ rotational mobs grade III  In Supine:  - MET shotgun and scissor correction for right anterior/left posterior innominate  - Long leg distraction      Neuromuscular Re-education     Therapeutic Activity     Therapeutic Exercises 10 Discussed POC and initiated HEP  - supine and seated figure 4 stretching  - hooklying band hip abduction unilaterally alternating sides L3 band    Gait training     Modality__________________                Total 55     Blank areas are intentional and mean the treatment did not include these items.   PT Evaluation Code: (Please list factors)  Patient History/Comorbidities: as above  Examination: as above   Clinical Presentation: stable   Clinical Decision Making: low     Patient History/  Comorbidities Examination  (body structures and functions, activity limitations, and/or participation restrictions) Clinical Presentation Clinical Decision Making (Complexity)   No documented Comorbidities or personal factors 1-2 Elements Stable and/or uncomplicated Low   1-2 documented comorbidities or personal factor 3 Elements Evolving clinical presentation with changing characteristics Moderate   3-4 documented comorbidities or personal factors 4 or more Unstable and unpredictable High Laney Melton, PT, DPT   9/15/2017  3:24 PM

## 2021-06-13 NOTE — PROGRESS NOTES
[facility] Discharge Summary  Patient Name: Blayne Jones  Date: 11/7/2017  Referral Diagnosis: Low Back Pain    Referring provider: Hansa Lyles FNP  Visit Diagnosis:   1. SI (sacroiliac) joint dysfunction     2. Muscle weakness (generalized)         Goals:  Pt. will be independent with home exercise program in : 6 weeks  Pt. will be able to walk : 30 minutes;60 minutes;with no pain;with less difficulty;for household mobility;for community mobility;for work;in 6 weeks  Pt. will bend: to dress;to clean;with less pain;with less difficulty;for self care;for work;in 6 weeks  Patient will ascend / descend: stairs;step;with less pain;with less difficulty;in 6 weeks  No Data Recorded    Patient was seen for 3 visits from 9/14/17 to 9/27/17 with 2 missed appointments.  The patient discontinued therapy, did not return.  No further therapy is required at this time.   Patient cancelled his last 2 appointments without a reason given on voicemail and will now be discharged from therapy.     Therapy will be discontinued at this time.  The patient will need a new referral to resume.    Thank you for your referral.  Laney Melton  11/7/2017  2:22 PM      [facility] Daily Progress     Patient Name: Blayne Jones  Date: 9/27/2017  Visit #: 3  PTA visit #:    Referral Diagnosis: Low Back Pain  Referring provider: Hansa Lyles FNP  Visit Diagnosis:     ICD-10-CM    1. SI (sacroiliac) joint dysfunction M53.3    2. Muscle weakness (generalized) M62.81      Initial Assessment:    Blayne Jones is a 56 y.o. male who presents to therapy today with chief complaints of right sided low back pain that started about 4-5 months ago and has not change since start. Pain increases with walking and weight bearing on the right side, if he rests or shifts weight off the pain will get better. Patient present with decrease lumbar ROM in flexion with some tightness in back and legs, hypomobile sacral base and SIJ  to PA mobs and positive SIJ findings such as FADIR, JEREMY and resisted abduction tests. Patient will benefit from skilled PT intervention to increase joint mobility and strength to decrease pain and overall functional mobility.     Assessment:     HEP/POC compliance is  good .  Patient demonstrates understanding/independence with home program.  Patient is benefitting from skilled physical therapy and is making steady progress toward functional goals.  Patient is appropriate to continue with skilled physical therapy intervention, as indicated by initial plan of care.    Goal Status:  Pt. will be independent with home exercise program in : 6 weeks  Pt. will be able to walk : 30 minutes;60 minutes;with no pain;with less difficulty;for household mobility;for community mobility;for work;in 6 weeks  Pt. will bend: to dress;to clean;with less pain;with less difficulty;for self care;for work;in 6 weeks  Patient will ascend / descend: stairs;step;with less pain;with less difficulty;in 6 weeks      Plan / Patient Education:     Continue with initial plan of care.  Progress with home program as tolerated.   Plan for next visit: continue with lumbar and SIJ rotational mobs, MET for pelvic alignment and sacral torsion if noted, progress strengthening and ROM exercises as able.     Subjective:     Pain Ratin   Patient reports the pain is better the rest of the day after he has been here. But then it comes back to where it is fine when he is sitting, he has pain after standing or walking for a period of time. Patient reports he did have walk about 2-3 block and the pain went from hip down to calf and increased to 7/10. Had to take about 4 rest breaks today at work, average 4-5 1-2 minute breaks per day.     Objective:     Therapeutic Exercise:    - Clam shells added L3 band x 15 reps B  - supine SLR x 10 reps B  - fig 4 stretching reviewed today  - Bridges with hip abduction band around knees  - Dynamic Hamstring stretching L3  band x 3 cycles   - standing side steps with L3 band 4 x 8 step each way  - standing hip extension L3 band x 10 reps B    Treatment Today   9/27/2017  TREATMENT MINUTES COMMENTS   Evaluation     Self-care/ Home management     Manual therapy 8  In Supine:  - MET shotgun and scissor correction for right anterior/left posterior innominate  - Long leg distraction   Neuromuscular Re-education     Therapeutic Activity     Therapeutic Exercises 20 NuStep WL 5 B LEs only x 5 min   See above      Gait training     Modality__________________                Total 28     Blank areas are intentional and mean the treatment did not include these items.     Laney Melton, PT, DPT   9/27/2017

## 2021-06-15 PROBLEM — E78.5 HLD (HYPERLIPIDEMIA): Status: ACTIVE | Noted: 2017-02-06

## 2021-06-15 PROBLEM — Z82.49 FAMILY HISTORY OF HEART DISEASE: Status: ACTIVE | Noted: 2017-02-06

## 2021-06-15 PROBLEM — R10.2 SUPRAPUBIC PAIN: Status: ACTIVE | Noted: 2017-02-06

## 2021-06-15 PROBLEM — Z72.0 TOBACCO USE: Status: ACTIVE | Noted: 2017-02-06

## 2021-06-15 NOTE — CONSULTS
[facility]  VASCULAR AND INTERVENTIONAL OUTPATIENT CLINIC           ESTABLISHED PATIENT - TELEPHONE VISIT  2/9/2021 2:30 PM     REASON FOR VISIT: Follow-up peripheral arterial disease    HPI: Mr. Jones is a 60-year-old gentleman seen in follow-up regarding bilateral lower extremity claudication. The patient was initially seen on 7/23/2020 where he reported significant bilateral lower extremity claudication after approximately 1 block. Patient underwent bilateral iliac artery stenting on 9/10/2020. The patient reports his perioperative course was unremarkable. He reports near complete resolution of his claudication symptoms since undergoing revascularization. He does report occasional cramping in his right calf while walking long distances, however, this is quite rare. He denies rest pain or lower extremity wounds/ulceration. His past history is significant for current smoking for which he has been for the past 40 years. The patient continues to smoke 1/2-1 pack daily. He has had to discontinue his Lipitor secondary to musculoskeletal side effects.    At this time he denies any chest pain, shortness of breath, dyspnea or neurologic defects. He denies any postprandial abdominal pain or dizziness with upper extremity exertion.    PAST MEDICAL AND SURGICAL HISTORY: Hyperlipidemia, peripheral arterial disease    MEDICATIONS: Plavix 75 mg once daily    IMAGING FINDINGS: All imaging was personally reviewed by myself and with the patient.  Ankle-brachial index/aortic ultrasound dated 1/18/2021    IMPRESSION:  1. RIGHT LOWER EXTREMITY: MICHELLE at rest is abnormal measuring 0.71 which corresponds with moderate peripheral vascular disease. Previously, the MICHELLE measured 0.63 on 7/10/2020. There is a significantly abnormal response to exercise. Duplex arterial ultrasound demonstrates monophasic waveforms in the right common and external iliac artery suggestive of significant disease in the proximal right common iliac  artery.    2. LEFT LOWER EXTREMITY: MICHELLE at rest is 0.97 which is borderline normal. Previously, the MICHELLE measured 0.73 on 7/10/2020. There is a normal response to exercise. Duplex arterial ultrasound demonstrates patent left common iliac artery stent with normal left external iliac artery waveform.    REVIEW OF SYMPTOMS: A comprehensive 10 point of symptoms was performed. All are negative except those noted in the history of present illness.    ASSESSMENT:     60-year-old male with recently diagnosed peripheral arterial disease and bilateral lower extremity claudication presents for follow-up after undergoing a lateral common iliac artery stenting on 9/10/2020. Overall, the patient has reported an excellent response to therapy with near complete resolution of his claudication symptoms. He is not able to walk many blocks without experiencing pain. He does report occasional cramping in his right calf when walking very long distances. He denies any rest pain or tissue loss. There has been significant improvement in his resting ankle-brachial indices, however, there remains some exercise-induced ischemia in his right lower extremity. Given his excellent overall clinical response, no indication for re-intervention. Unfortunately Blayne continues to smoke and this will greatly limit the durability of any endovascular or surgical intervention.    PLAN:    GUIDELINE BASED MEDICAL THERAPY FOR PERIPHERAL ARTERIAL DISEASE (CLARE GRADE 1, CATEGORY 3).    -Encourage home-based exercise therapy with sessions least 3 times per week with lasting 30 to 60 minutes each.   -The patient was previously started on Lipitor 40 mg once daily for secondary prophylaxis of major adverse cardiac events (Heart Protection Study and REACH registry studies). However, he reports significant arm and wrist pain which resolved after discontinuing the medication. It is reasonable to attempt therapy with another statin such as Crestor. The patient  would like to speak to his primary care physician prior to starting this medication.  -Continue antiplatelet therapy with Plavix 75 mg once daily given superiority in decreasing composite cardiovascular mortality, myocardial infarction and stroke over aspirin (CAPrie trial).   -Encourage smoking cessation. I have instructed him to continue CT lung cancer screening with Dr. Whitney based on his long-standing history of smoking.  -We will also perform a carotid ultrasound during his next visit to screen for disease in this vascular bed.    Follow-up in 6 months or sooner if the need arises.    Andres Frey MD, [facility]  Vascular and Interventional Physician  Vascular Medicine  Internal Medicine  [facility]  Clinic: [phone]

## 2021-06-17 NOTE — PROGRESS NOTES
Noted on Colorectal cancer screening report, no screening on file. Also noted, in reviewing chart, during an Office visit with MERLYN Romero, documented patient had recent colonoscopy in 2016.  Additionally, noted [facility]L was [facility] to [facility] for copies of reports from procedures. Did not see report scanned into the chart.  [facility]ed to [facility] for copy of reports to be refaxed.

## 2021-06-17 NOTE — PROGRESS NOTES
Vascular and Interventional Radiology Note:    -The patient contacted our office earlier complaining of new onset significant lower extremity claudication after walking only a short distance.  He denies any rest pain or lower extremity wounds or ulcerations.  This started after he underwent an elective colonoscopy.  Unfortunately, he continues to smoke.  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  There is diminished flow in both external iliac arteries, likely related to underperfusion.  His runoff remains intact.  At this point, he will require bypass.  I again reviewed with him the importance of smoking cessation.  His insurance has changed to [employer] and we will make the appropriate referral.  This was discussed in detail with the patient after his CT scan.  He is to report to the emergency room if his symptoms worsen.  All questions were answered.    ADY Frey MD

## 2021-06-20 NOTE — LETTER
Letter by Charlette Lopez CNP at      Author: Charlette Lopez CNP Service: -- Author Type: --    Filed:  Encounter Date: 9/10/2020 Status: (Other)         September 10, 2020     Patient: Blayne Jones   YOB: 1960   Date of Visit: 9/10/2020       To Whom It May Concern:    It is my medical opinion that Blayne Jones may return to work on 9/14/20 but may not lift heaver than 10 lbs until 9/17/20.    If you have any questions or concerns, please don't hesitate to call.    Sincerely,        Electronically signed by Charlette Lopez CNP

## 2021-07-02 NOTE — H&P
H&P by Charlette Lopez CNP at 9/10/2020  8:00 AM     Author: John, Charlette J, CNP Service: Interventional Radiology Author Type: Nurse Practitioner    Filed: 9/10/2020  8:55 AM Date of Service: 9/10/2020  8:00 AM Status: Addendum    : Charlette Lopez CNP (Nurse Practitioner)    Related Notes: Original Note by Charlette Lopez CNP (Nurse Practitioner) filed at 9/10/2020  7:58 AM         Interventional Radiology - History and Physical  9/10/2020    Procedure Requested: BLE angiogram  Requesting Provider: Dr. Frey     HPI: Blayne Jones is a 59 y.o. old male with history of HLD, current smoker, newly diagnosed PAD and bilateral lower extremity claudication. Patient was seen recently via virtual clinic by Dr. Frey in regards to claudication. Patient reports tightness in bilateral thighs and calves when walking 1 block. Pain started about 1 year ago, worsening over the last few months. CTA shows proximal common iliac artery disease with occlusion of the right ERNESTO and focal near occlusive stenosis on the left ERNESTO.  Dr. Frey recommends BLE angiogram with likely bilateral common iliac stent placements for which patient presents today.     Patient reports he is still currently smoking with plans to quit today    Imaging:   CT angiogram of the abdomen and pelvis with lower extremity runoff dated 7/15/2020     1. Proximal common iliac artery disease with occlusion of the right ERNESTO and focal near occlusive stenosis on the left ERNESTO. There is superimposed diffuse moderate plaque of the right ERNESTO to the iliac bifurcation.  2. Infrarenal aorta has soft atheromatous plaque/mural thrombus which causes approximately 30% luminal narrowing. There is moderate irregularity of the plaque at the anterior aortic wall just inferior to the KANDI takeoff. No evidence of penetrating ulcer.  3. The bilateral femoral, popliteal, and infrapopliteal segments are widely patent. Reconstituted flow to the right lower extremity is predominantly  through the inferior epigastric and deep circumflex iliac artery to common femoral artery collateral pathways.    LOCATION: [facility]  DATE: 7/10/2020  EXAM: RESTING AND POSTEXERCISE ANKLE BRACHIAL INDICES (ABIs)  INDICATION: Decreased lower extremity pulses, lower extremity claudication,     IMPRESSION:   1. RIGHT LOWER EXTREMITY: Resting ankle-brachial index of 0.63 suggesting moderate peripheral arterial disease. There is severe exercise-induced ischemia (0.21).     2. LEFT LOWER EXTREMITY: Resting ankle-brachial index of 0.73 suggesting moderate peripheral arterial disease. There is severe exercise-induced ischemia (0.29).    NPO Status: MN  Anticoagulation/Antiplatelets/Bleeding tendencies: Plavix  Antibiotics: None    Review of Systems: A comprehensive 10-point review of systems was performed. All systems were reviewed and negative with exception to those reported in the HPI.    PMH:  Past Medical History:   Diagnosis Date   ? Hyperlipidemia        PSH:  Past Surgical History:   Procedure Laterality Date   ? CERVICAL SPINE SURGERY  2012       ALLERGIES  Patient has no known allergies.    MEDICATIONS:  Current Outpatient Medications on File Prior to Encounter   Medication Sig Dispense Refill   ? atorvastatin (LIPITOR) 40 MG tablet Take 1 tablet (40 mg total) by mouth daily. 90 tablet 3   ? clopidogreL (PLAVIX) 75 mg tablet Take 1 tablet (75 mg total) by mouth daily. 90 tablet 3     No current facility-administered medications on file prior to encounter.        LABS:  INR (no units)   Date Value   09/10/2020 0.93     Hemoglobin (g/dL)   Date Value   09/10/2020 17.4     Platelets (thou/uL)   Date Value   09/10/2020 160     Creatinine (mg/dL)   Date Value   09/10/2020 0.79       EXAM:  /75   Pulse (!) 57   Temp 98.1  F (36.7  C)   Resp 16   Ht 6' (1.829 m)   Wt 190 lb (86.2 kg)   SpO2 99%   BMI 25.77 kg/m    General: Stable. In no acute distress  Neuro: A&O x3.    Resp: Lungs CTA  bilaterally.  Cardio: S1S2 and reg, without murmur, clicks or rubs.  Vascular:  +1 bilateral femoral pulses  Skin:  Without excoriations, ecchymosis, erythema, lesions or open sores on bilateral groin    Pre-Sedation Assessment:  Mallampati Airway Classification: Class 2: upper half of tonsil fossa visible  Previous reaction to anesthesia/sedation: no  Sedation plan based on assessment: Moderate  Sleep Apnea: no  Dentures: no  COPD: no  ASA Classification: ASA 3 - Patient with moderate systemic disease with functional limitations    ASSESSMENT:  59 year old male with PAD, bilateral lower extremity claudication     PLAN:    Bilateral lower extremity angiogram with possible stent, angioplasty, lysis with sedation     The procedure, risks and moderate sedation were discussed with patient, all questions answered and patient agrees to proceed with the procedure.      Charlette Lopez, CNP  Interventional Radiology  [phone]

## 2023-04-12 ENCOUNTER — HOSPITAL ENCOUNTER (EMERGENCY)
Facility: HOSPITAL | Age: 63
Discharge: HOME | End: 2023-04-12
Attending: EMERGENCY MEDICINE | Admitting: EMERGENCY MEDICINE
Payer: COMMERCIAL

## 2023-04-12 VITALS
HEIGHT: 70 IN | OXYGEN SATURATION: 96 % | BODY MASS INDEX: 29.92 KG/M2 | SYSTOLIC BLOOD PRESSURE: 158 MMHG | WEIGHT: 209 LBS | DIASTOLIC BLOOD PRESSURE: 78 MMHG | TEMPERATURE: 99.1 F | HEART RATE: 94 BPM | RESPIRATION RATE: 16 BRPM

## 2023-04-12 DIAGNOSIS — R29.810 FACIAL DROOP: ICD-10-CM

## 2023-04-12 DIAGNOSIS — G51.0 BELL'S PALSY: ICD-10-CM

## 2023-04-12 LAB
HOLD SPECIMEN: NORMAL

## 2023-04-12 PROCEDURE — 99284 EMERGENCY DEPT VISIT MOD MDM: CPT

## 2023-04-12 RX ORDER — PREDNISONE 20 MG/1
60 TABLET ORAL DAILY
Qty: 21 TABLET | Refills: 0 | Status: SHIPPED | OUTPATIENT
Start: 2023-04-12 | End: 2023-04-19

## 2023-04-12 RX ORDER — POLYVINYL ALCOHOL 14 MG/ML
1 SOLUTION/ DROPS OPHTHALMIC PRN
Qty: 15 ML | Refills: 0 | Status: SHIPPED | OUTPATIENT
Start: 2023-04-12

## 2023-04-12 ASSESSMENT — ENCOUNTER SYMPTOMS
FACIAL ASYMMETRY: 1
DIZZINESS: 0
WEAKNESS: 0
NUMBNESS: 1
HEADACHES: 0

## 2023-04-12 NOTE — ED NOTES
Expected Patient Referral to ED  6:41 PM    Referring Clinic/Provider:  SEE Geller, [facility]    Reason for referral/Clinical facts:  Right side lip numbness  1 week, l eye could not week.  He can close them in tandem with the other eye but not by itself.  Patient refused EMS transport.  Says he is driving himself.    Recommendations provided:  Send to ED for further evaluation    Caller was informed that this institution does possess the capabilities and/or resources to provide for patient and should be transferred to our facility.    Discussed that if direct admit is sought and any hurdles are encountered, this ED would be happy to see the patient and evaluate.    Informed caller that recommendations provided are recommendations based only on the facts provided and that they responsible to accept or reject the advice, or to seek a formal in person consultation as needed and that this ED will see/treat patient should they arrive.      Austin Goldsmith MD  [facility] EMERGENCY DEPARTMENT  [address] [place] [id number]  [phone]       Austin Goldsmith MD  04/12/23 7090

## 2023-04-13 NOTE — ED TRIAGE NOTES
The pt states that for 1 week patient has had facial numbness on the right lip. The pt states he has a hx of bells palsy. Pt is a smoker, hx of htn, takes BP meds at home.

## 2023-04-13 NOTE — ED PROVIDER NOTES
EMERGENCY DEPARTMENT ENCOUNTER      NAME: Blayne Jones  AGE: 62 year old male  YOB: 1960  MRN: [id number]  EVALUATION DATE & TIME: No admission date for patient encounter.    PCP: Harshal Ordonez    ED PROVIDER: Brad Holly M.D.      Chief Complaint   Patient presents with     Facial Droop         FINAL IMPRESSION:  1. Facial droop    2. Bell's palsy          ED COURSE & MEDICAL DECISION MAKIN:30 PM Medical student saw patient.   8:45 PM I introduced myself to the patient, obtained patient history, performed a physical exam, and discussed plan for ED workup including potential diagnostic laboratory/imaging studies and interventions.    Pertinent Labs & Imaging studies reviewed below.  All EKGs below represent my independent interpretation.   ED Course as of 23 Patient is a 62-year-old gentleman presents with couple days of tingling of the right upper lip, left lip and left eyelid weakness.  His wife was concerned today, wanted him to be evaluated here.  History of Bell's palsy in the past.  On arrival he is hypertensive at 214/114.  On physical exam he does have left-sided facial weakness, and there is mild amount of weakness to the left eyebrow and forehead, but not diagnostic to the point where I felt confident enough to call this Bell's palsy, and not as a result of stroke.  He has no other signs of stroke, no weakness in his upper or lower extremities.  Vision is normal.  He has some risk factors for stroke as well, hypertension, diabetes.  I recommended getting a screening MRI, he declined due to time restrictions.  I sent him home with medications for Bell's palsy, encouraged him to follow-up with primary care doctor back immediately if anything else becomes weak.  He takes a daily aspirin, he will continue doing this.       Medical Decision Making    History:    Supplemental history from: Documented in chart, if applicable    External  Record(s) reviewed: Documented in chart, if applicable.    Work Up:    Chart documentation includes differential considered and any EKGs or imaging independently interpreted by provider, where specified.    In additional to work up documented, I considered the following work up: Documented in chart, if applicable.    External consultation:    Discussion of management with another provider: Documented in chart, if applicable    Complicating factors:    Care impacted by chronic illness: N/A    Care affected by social determinants of health: N/A    Disposition considerations: Discharge. I prescribed additional prescription strength medication(s) as charted. N/A.    At the conclusion of the encounter I discussed the results of all of the tests and the disposition. The questions were answered. The patient or family acknowledged understanding and was agreeable with the care plan.         MEDICATIONS GIVEN IN THE EMERGENCY:  Medications - No data to display      NEW PRESCRIPTIONS STARTED AT TODAY'S ER VISIT  Discharge Medication List as of 4/12/2023  9:11 PM      START taking these medications    Details   polyvinyl alcohol (LIQUIFILM TEARS) 1.4 % ophthalmic solution Place 1 drop Into the left eye as needed for dry eyes, Disp-15 mL, R-0, Local Print      predniSONE (DELTASONE) 20 MG tablet Take 3 tablets (60 mg) by mouth daily for 7 days, Disp-21 tablet, R-0, E-Prescribe                =================================================================    HPI    Patient information was obtained from: Patient     Use of : N/A         Blayne Jones is a 62 year old male with a past medical history of bell's palsy (self reported), hypertension, hyperlipidemia, peripheral artery disease, diabetes mellitus type 2 and smoker who presents to this ED for evaluation of facial droop.    Patient reports left facial droop that began yesterday. Also states that he's been unable to close his left eye and endorses right lip  "tingling as well. Reports that it feels like he has a \"fat lip\" and has trouble spitting when brushing his teeth. Patient denies any weakness, or other numbness and tingling. He has a history of bell's palsy and smokes 1/2 pack a day. Denies any headache, dizziness, or chest pain.  Denies any trauma or injury.            REVIEW OF SYSTEMS   Review of Systems   Cardiovascular: Negative for chest pain.   Neurological: Positive for facial asymmetry (Left facial droop) and numbness (Right lip). Negative for dizziness, weakness and headaches.       VITALS:  BP (!) 158/78   Pulse 94   Temp 99.1  F (37.3  C) (Temporal)   Resp 16   Ht 1.778 m (5' 10\")   Wt 94.8 kg (209 lb)   SpO2 96%   BMI 29.99 kg/m      PHYSICAL EXAM    Constitutional: Well developed, well nourished. Comfortable appearing.  HENT: Normocephalic, atraumatic, mucous membranes moist, nose normal. Neck- Supple, gross ROM intact.   Eyes: Pupils mid-range, conjunctiva without injection, no discharge.   Respiratory: Clear to auscultation bilaterally, no respiratory distress, no wheezing, speaks full sentences easily. No cough.  Cardiovascular: Normal heart rate, regular rhythm, no murmurs.   Musculoskeletal: Moving all 4 extremities intentionally and without pain. No obvious deformity.  Skin: Warm, dry, no rash.  Neurologic: Left-sided facial droop which includes mouth, eye, but he is able to close his eye completely.  Minimal weakness to the left eyebrow and forehead. 5/5 strength in upper and lower extremities, normal gait.  Psychiatric: Affect normal, cooperative.      I, Jose Leiva am serving as a scribe to document services personally performed by Dr. Brad Holly based on my observation and the provider's statements to me. IBrad MD attest that Jose Leiva is acting in a scribe capacity, has observed my performance of the services and has documented them in accordance with my direction.    Brad Holly, " M.D.  Emergency Medicine  [facility] EMERGENCY DEPARTMENT  [facility] [facility] [id number] [phone]  Dept: [phone]     Brad Holly MD  04/13/23 0049

## 2023-04-13 NOTE — DISCHARGE INSTRUCTIONS
As we discussed, your symptoms are very likely due to Bell's palsy which is a safe but bothersome condition with your facial nerve.  I did recommend getting an MRI to rule out stroke.    If we treat this as Bell's palsy, please take the steroids as instructed.  Reach out to primary care clinic and let him know that an MRI was recommended.    Develop any weakness or numbness in your arms or legs, please come back immediately for reevaluation.    Use the eyedrops to prevent any redness or irritation of the eye.  Use Scotch tape to close the eye when you are sleeping.